# Patient Record
Sex: MALE | Race: WHITE | NOT HISPANIC OR LATINO | URBAN - METROPOLITAN AREA
[De-identification: names, ages, dates, MRNs, and addresses within clinical notes are randomized per-mention and may not be internally consistent; named-entity substitution may affect disease eponyms.]

---

## 2017-11-15 ENCOUNTER — EMERGENCY (EMERGENCY)
Facility: HOSPITAL | Age: 57
LOS: 1 days | Discharge: HOME | End: 2017-11-15
Attending: EMERGENCY MEDICINE | Admitting: EMERGENCY MEDICINE
Payer: COMMERCIAL

## 2017-11-15 VITALS
OXYGEN SATURATION: 98 % | SYSTOLIC BLOOD PRESSURE: 143 MMHG | WEIGHT: 169.98 LBS | DIASTOLIC BLOOD PRESSURE: 83 MMHG | RESPIRATION RATE: 18 BRPM | TEMPERATURE: 99 F | HEART RATE: 74 BPM

## 2017-11-15 PROCEDURE — 73130 X-RAY EXAM OF HAND: CPT | Mod: 26,RT

## 2017-11-15 PROCEDURE — 99283 EMERGENCY DEPT VISIT LOW MDM: CPT | Mod: 25

## 2017-11-15 PROCEDURE — 99283 EMERGENCY DEPT VISIT LOW MDM: CPT

## 2017-11-15 PROCEDURE — 73130 X-RAY EXAM OF HAND: CPT

## 2017-11-15 RX ORDER — ASPIRIN/CALCIUM CARB/MAGNESIUM 324 MG
0 TABLET ORAL
Qty: 0 | Refills: 0 | COMMUNITY

## 2017-11-15 RX ORDER — CEPHALEXIN 500 MG
1 CAPSULE ORAL
Qty: 15 | Refills: 0 | OUTPATIENT
Start: 2017-11-15 | End: 2017-11-20

## 2017-11-15 RX ORDER — IBUPROFEN 200 MG
400 TABLET ORAL ONCE
Qty: 0 | Refills: 0 | Status: COMPLETED | OUTPATIENT
Start: 2017-11-15 | End: 2017-11-15

## 2017-11-15 RX ADMIN — Medication 400 MILLIGRAM(S): at 20:27

## 2017-11-15 NOTE — ED PROVIDER NOTE - MEDICAL DECISION MAKING DETAILS
s/p ten  days of finger and then hand swelling and pain-  xray neg for obvious fx or fb or osteo  pt given keflex-  f/u with hand specialist s/p ten  days of finger and then hand swelling and pain-  xray neg for obvious fx or fb or osteo  pt given keflex-  f/u with hand specialist  Attending Statement: Agree with the above.  10 days finger nodule at volar L 4th MCP c thick d/c that seems c/w ganlion cyst.  Exam c/w ruptured cyst; does not seem c/w gross cellulitis, FTS, septic joint.  No d/c at this time, no portal of entry.  Soft compartments.  Trace erythema around site of swelling but not significant, no proximal streaking, no f/c, vss.  Pt given Rx for abx (unknown that PCP Rx'd), recommended nsaids/RICE, and hand f/u referral provided.  --BMM

## 2017-11-15 NOTE — ED PROVIDER NOTE - CARE PLAN
Principal Discharge DX:	Ganglion of hand, right  Instructions for follow-up, activity and diet:	Take keflex  as prescribed  Return to the ER for any concerns such as uncontrolled  pain or fever>101  Follow up with Dr. Parviz Campuzano  hand specialist

## 2017-11-15 NOTE — ED PROVIDER NOTE - PLAN OF CARE
Take keflex  as prescribed  Return to the ER for any concerns such as uncontrolled  pain or fever>101  Follow up with Dr. Parviz Campuzano  hand specialist

## 2017-11-15 NOTE — ED ADULT TRIAGE NOTE - CHIEF COMPLAINT QUOTE
right finger/hand swelling with discomfort, started around 10days ago  Seen by PMD today to get further evaluation

## 2017-11-15 NOTE — ED PROVIDER NOTE - OBJECTIVE STATEMENT
55 yo male in room 1 for evaluation of right hand pain and swelling for the past 10 days. Pt states "Ten years ago I developed pain and swelling to my right ring finger and it oozed thick clear fluid and it went away. I had the same thing happen a few months ago and  ten days ago it occurred again and became worse. The pain and swelling moved into my hand and up my arm. The swelling is so bad that it is not allowing me to open and close my hand easily. I went to Dr. Grimes this afternoon and he told me to come to the ER and see 57 yo male in room 1 for evaluation of right hand pain and swelling for the past 10 days. Pt states "Ten years ago I developed pain and swelling to my right ring finger and it oozed thick clear fluid and it went away. I had the same thing happen a few months ago and  ten days ago it occurred again and became worse. The pain and swelling moved into my hand and up my arm. The swelling is so bad that it is not allowing me to open and close my hand easily. I went to Dr. Grimes this afternoon and he told me to come to the ER.  Pt given antibiotic and steroid rx by Dr. Grimes.  Pt denies recent injury or trauma. Right hand with dec ROM due to sts. Skin intact. 55 yo male in room 1 for evaluation of right hand pain and swelling for the past 10 days. Pt states "Ten years ago I developed pain and swelling to my right ring finger and it oozed thick clear fluid and it went away. I had the same thing happen a few months ago and  ten days ago it occurred again and became worse. The pain and swelling moved into my hand and up my arm. The swelling is so bad that it is not allowing me to open and close my hand easily. I went to Dr. Grimes this afternoon and he told me to come to the ER.  Pt given antibiotic and steroid rx by Dr. Grimes today but patient did not fill Rx.  Pt denies recent injury or trauma. Right hand with dec ROM due to sts. Skin intact.

## 2017-11-16 ENCOUNTER — APPOINTMENT (OUTPATIENT)
Dept: ORTHOPEDIC SURGERY | Facility: CLINIC | Age: 57
End: 2017-11-16
Payer: COMMERCIAL

## 2017-11-16 DIAGNOSIS — M79.89 OTHER SPECIFIED SOFT TISSUE DISORDERS: ICD-10-CM

## 2017-11-16 DIAGNOSIS — S62.015D NONDISPLACED FRACTURE OF DISTAL POLE OF NAVICULAR [SCAPHOID] BONE OF LEFT WRIST, SUBSEQUENT ENCOUNTER FOR FRACTURE WITH ROUTINE HEALING: ICD-10-CM

## 2017-11-16 PROCEDURE — 99214 OFFICE O/P EST MOD 30 MIN: CPT

## 2018-01-12 ENCOUNTER — APPOINTMENT (OUTPATIENT)
Dept: ORTHOPEDIC SURGERY | Facility: CLINIC | Age: 58
End: 2018-01-12

## 2020-10-22 ENCOUNTER — NON-APPOINTMENT (OUTPATIENT)
Age: 60
End: 2020-10-22

## 2020-10-22 ENCOUNTER — APPOINTMENT (OUTPATIENT)
Dept: OPHTHALMOLOGY | Facility: CLINIC | Age: 60
End: 2020-10-22
Payer: COMMERCIAL

## 2020-10-22 PROCEDURE — 99072 ADDL SUPL MATRL&STAF TM PHE: CPT

## 2020-10-22 PROCEDURE — 92004 COMPRE OPH EXAM NEW PT 1/>: CPT

## 2022-02-09 ENCOUNTER — TRANSCRIPTION ENCOUNTER (OUTPATIENT)
Age: 62
End: 2022-02-09

## 2024-11-08 ENCOUNTER — APPOINTMENT (OUTPATIENT)
Dept: VASCULAR SURGERY | Facility: CLINIC | Age: 64
End: 2024-11-08
Payer: MEDICARE

## 2024-11-08 VITALS
HEIGHT: 69 IN | BODY MASS INDEX: 25.48 KG/M2 | DIASTOLIC BLOOD PRESSURE: 78 MMHG | WEIGHT: 172 LBS | HEART RATE: 82 BPM | SYSTOLIC BLOOD PRESSURE: 132 MMHG

## 2024-11-08 DIAGNOSIS — M79.673 PAIN IN UNSPECIFIED FOOT: ICD-10-CM

## 2024-11-08 DIAGNOSIS — M79.676 PAIN IN UNSPECIFIED FOOT: ICD-10-CM

## 2024-11-08 PROCEDURE — 99204 OFFICE O/P NEW MOD 45 MIN: CPT

## 2024-11-08 PROCEDURE — 93971 EXTREMITY STUDY: CPT

## 2024-11-08 PROCEDURE — 93922 UPR/L XTREMITY ART 2 LEVELS: CPT

## 2024-11-08 RX ORDER — ATORVASTATIN CALCIUM 20 MG/1
20 TABLET, FILM COATED ORAL
Refills: 0 | Status: ACTIVE | COMMUNITY

## 2024-11-11 ENCOUNTER — APPOINTMENT (OUTPATIENT)
Dept: VASCULAR SURGERY | Facility: CLINIC | Age: 64
End: 2024-11-11

## 2024-11-13 PROBLEM — M79.673 PAIN OF FOOT AND TOES: Status: ACTIVE | Noted: 2024-11-13

## 2024-11-18 ENCOUNTER — OFFICE (OUTPATIENT)
Age: 64
Setting detail: OPHTHALMOLOGY
End: 2024-11-18
Payer: MEDICARE

## 2024-11-18 DIAGNOSIS — H02.831: ICD-10-CM

## 2024-11-18 DIAGNOSIS — H53.40: ICD-10-CM

## 2024-11-18 DIAGNOSIS — H57.813: ICD-10-CM

## 2024-11-18 DIAGNOSIS — H02.834: ICD-10-CM

## 2024-11-18 PROCEDURE — SCCOS COSMETIC CONSULTATION: Performed by: OPHTHALMOLOGY

## 2024-11-18 PROCEDURE — 92285 EXTERNAL OCULAR PHOTOGRAPHY: CPT | Performed by: OPHTHALMOLOGY

## 2024-11-18 PROCEDURE — 99204 OFFICE O/P NEW MOD 45 MIN: CPT | Performed by: OPHTHALMOLOGY

## 2024-11-18 ASSESSMENT — LID EXAM ASSESSMENTS
OS_COMMENTS: LOWER FAT PROLAPSE LL COMMENT
OD_BROW_PTOSIS: +
OS_BROW_PTOSIS: +
OD_COMMENTS: LOWER FAT PROLAPSE LL COMMENTS
OD_COMMENTS: LOWER FAT PROLAPSE LL COMMENT
OS_COMMENTS: LOWER FAT PROLAPSE LL COMMENTS

## 2024-11-18 ASSESSMENT — LID POSITION - DERMATOCHALASIS
OD_DERMATOCHALASIS: RUL 2+
OS_DERMATOCHALASIS: LUL 2+

## 2024-11-18 ASSESSMENT — VISUAL ACUITY
OD_BCVA: 20/25
OS_BCVA: 20/20-2

## 2024-11-18 ASSESSMENT — CONFRONTATIONAL VISUAL FIELD TEST (CVF)
OS_FINDINGS: FULL
OD_FINDINGS: FULL

## 2025-02-17 ENCOUNTER — OFFICE (OUTPATIENT)
Facility: LOCATION | Age: 65
Setting detail: OPHTHALMOLOGY
End: 2025-02-17
Payer: MEDICARE

## 2025-02-17 DIAGNOSIS — H53.40: ICD-10-CM

## 2025-02-17 PROCEDURE — 92082 INTERMEDIATE VISUAL FIELD XM: CPT | Performed by: OPHTHALMOLOGY

## 2025-02-20 ASSESSMENT — VISUAL ACUITY
OD_BCVA: 20/25
OS_BCVA: 20/20-2

## 2025-03-21 ENCOUNTER — AMBULATORY SURGERY CENTER (OUTPATIENT)
Dept: URBAN - METROPOLITAN AREA SURGERY 21 | Facility: SURGERY | Age: 65
Setting detail: OPHTHALMOLOGY
End: 2025-03-21
Payer: MEDICARE

## 2025-03-21 DIAGNOSIS — H57.813: ICD-10-CM

## 2025-03-21 DIAGNOSIS — H02.834: ICD-10-CM

## 2025-03-21 DIAGNOSIS — H02.831: ICD-10-CM

## 2025-03-21 PROCEDURE — 15823 BLEPHARP UPR EYELID XCSV SKN: CPT | Mod: 50 | Performed by: OPHTHALMOLOGY

## 2025-03-21 PROCEDURE — 67900 REPAIR BROW DEFECT: CPT | Mod: 51,50 | Performed by: OPHTHALMOLOGY

## 2025-03-28 ENCOUNTER — APPOINTMENT (OUTPATIENT)
Dept: VASCULAR SURGERY | Facility: CLINIC | Age: 65
End: 2025-03-28
Payer: MEDICARE

## 2025-03-28 VITALS
HEART RATE: 73 BPM | HEIGHT: 69 IN | WEIGHT: 173 LBS | BODY MASS INDEX: 25.62 KG/M2 | TEMPERATURE: 97.4 F | SYSTOLIC BLOOD PRESSURE: 114 MMHG | DIASTOLIC BLOOD PRESSURE: 76 MMHG

## 2025-03-28 DIAGNOSIS — M79.676 PAIN IN UNSPECIFIED FOOT: ICD-10-CM

## 2025-03-28 DIAGNOSIS — R23.0 CYANOSIS: ICD-10-CM

## 2025-03-28 DIAGNOSIS — M79.673 PAIN IN UNSPECIFIED FOOT: ICD-10-CM

## 2025-03-28 PROCEDURE — 99214 OFFICE O/P EST MOD 30 MIN: CPT

## 2025-04-01 ENCOUNTER — APPOINTMENT (OUTPATIENT)
Dept: CT IMAGING | Facility: IMAGING CENTER | Age: 65
End: 2025-04-01

## 2025-04-01 ENCOUNTER — OFFICE (OUTPATIENT)
Facility: LOCATION | Age: 65
Setting detail: OPHTHALMOLOGY
End: 2025-04-01

## 2025-04-01 ENCOUNTER — OUTPATIENT (OUTPATIENT)
Dept: OUTPATIENT SERVICES | Facility: HOSPITAL | Age: 65
LOS: 1 days | End: 2025-04-01
Payer: MEDICARE

## 2025-04-01 DIAGNOSIS — M79.673 PAIN IN UNSPECIFIED FOOT: ICD-10-CM

## 2025-04-01 DIAGNOSIS — H57.813: ICD-10-CM

## 2025-04-01 DIAGNOSIS — H02.834: ICD-10-CM

## 2025-04-01 DIAGNOSIS — H02.831: ICD-10-CM

## 2025-04-01 PROCEDURE — 74174 CTA ABD&PLVS W/CONTRAST: CPT | Mod: 26

## 2025-04-01 PROCEDURE — 99024 POSTOP FOLLOW-UP VISIT: CPT | Performed by: OPHTHALMOLOGY

## 2025-04-01 PROCEDURE — 71275 CT ANGIOGRAPHY CHEST: CPT | Mod: 26

## 2025-04-01 PROCEDURE — 71275 CT ANGIOGRAPHY CHEST: CPT

## 2025-04-01 PROCEDURE — 74174 CTA ABD&PLVS W/CONTRAST: CPT

## 2025-04-01 ASSESSMENT — LID POSITION - DERMATOCHALASIS
OD_DERMATOCHALASIS: ABSENT
OS_DERMATOCHALASIS: ABSENT

## 2025-04-01 ASSESSMENT — LID EXAM ASSESSMENTS
OS_COMMENTS: ABSENT MEDIAL
OS_COMMENTS: LOWER FAT PROLAPSE LL COMMENTS
OS_COMMENTS: LOWER FAT PROLAPSE LL COMMENT
OD_COMMENTS: LOWER FAT PROLAPSE LL COMMENT
OD_COMMENTS: LOWER FAT PROLAPSE LL COMMENTS
OS_COMMENTS: ABSENT LATERAL
OD_COMMENTS: ABSENT MEDIAL
OD_COMMENTS: ABSENT LATERAL

## 2025-04-01 ASSESSMENT — VISUAL ACUITY
OS_BCVA: 20/20-3
OD_BCVA: 20/20-1

## 2025-04-01 ASSESSMENT — CONFRONTATIONAL VISUAL FIELD TEST (CVF)
OD_FINDINGS: FULL
OS_FINDINGS: FULL

## 2025-04-07 ENCOUNTER — APPOINTMENT (OUTPATIENT)
Dept: VASCULAR SURGERY | Facility: CLINIC | Age: 65
End: 2025-04-07
Payer: MEDICARE

## 2025-04-07 PROCEDURE — 93925 LOWER EXTREMITY STUDY: CPT

## 2025-04-10 ENCOUNTER — APPOINTMENT (OUTPATIENT)
Dept: VASCULAR SURGERY | Facility: CLINIC | Age: 65
End: 2025-04-10
Payer: MEDICARE

## 2025-04-10 PROCEDURE — 99212 OFFICE O/P EST SF 10 MIN: CPT | Mod: 93

## 2025-04-24 ENCOUNTER — TRANSCRIPTION ENCOUNTER (OUTPATIENT)
Age: 65
End: 2025-04-24